# Patient Record
Sex: MALE | Race: BLACK OR AFRICAN AMERICAN | NOT HISPANIC OR LATINO | URBAN - METROPOLITAN AREA
[De-identification: names, ages, dates, MRNs, and addresses within clinical notes are randomized per-mention and may not be internally consistent; named-entity substitution may affect disease eponyms.]

---

## 2024-01-01 ENCOUNTER — EMERGENCY (EMERGENCY)
Facility: HOSPITAL | Age: 44
LOS: 1 days | Discharge: HOME | End: 2024-01-01
Admitting: EMERGENCY MEDICINE
Payer: COMMERCIAL

## 2024-01-01 VITALS
HEART RATE: 56 BPM | SYSTOLIC BLOOD PRESSURE: 143 MMHG | DIASTOLIC BLOOD PRESSURE: 93 MMHG | RESPIRATION RATE: 18 BRPM | OXYGEN SATURATION: 99 % | TEMPERATURE: 97 F

## 2024-01-01 PROCEDURE — 99284 EMERGENCY DEPT VISIT MOD MDM: CPT

## 2024-01-01 RX ORDER — DIAZEPAM 5 MG
5 TABLET ORAL ONCE
Refills: 0 | Status: DISCONTINUED | OUTPATIENT
Start: 2024-01-01 | End: 2024-01-01

## 2024-01-01 RX ORDER — LIDOCAINE 4 G/100G
1 CREAM TOPICAL ONCE
Refills: 0 | Status: COMPLETED | OUTPATIENT
Start: 2024-01-01 | End: 2024-01-01

## 2024-01-01 RX ORDER — KETOROLAC TROMETHAMINE 30 MG/ML
30 SYRINGE (ML) INJECTION ONCE
Refills: 0 | Status: DISCONTINUED | OUTPATIENT
Start: 2024-01-01 | End: 2024-01-01

## 2024-01-01 RX ORDER — OXYCODONE HYDROCHLORIDE 5 MG/1
5 TABLET ORAL ONCE
Refills: 0 | Status: DISCONTINUED | OUTPATIENT
Start: 2024-01-01 | End: 2024-01-01

## 2024-01-01 RX ADMIN — LIDOCAINE 1 PATCH: 4 CREAM TOPICAL at 23:47

## 2024-01-01 RX ADMIN — OXYCODONE HYDROCHLORIDE 5 MILLIGRAM(S): 5 TABLET ORAL at 23:47

## 2024-01-01 RX ADMIN — Medication 5 MILLIGRAM(S): at 23:47

## 2024-01-01 RX ADMIN — Medication 30 MILLIGRAM(S): at 23:47

## 2024-01-01 NOTE — ED ADULT TRIAGE NOTE - CHIEF COMPLAINT QUOTE
Patient c/o left-sided back pain x 1 week. Denies trauma, numbness/tingling and incontinence. Hx. back surgery.

## 2024-01-01 NOTE — ED PROVIDER NOTE - PATIENT PORTAL LINK FT
You can access the FollowMyHealth Patient Portal offered by [facility] by registering at the following website: [email or web address]. By joining [employer]’s FollowMyHealth portal, you will also be able to view your health information using other applications (apps) compatible with our system. You can access the FollowMyHealth Patient Portal offered by [facility] by registering at the following website: [email or web address]. By joining [employer]’s FollowMyHealth portal, you will also be able to view your health information using other applications (apps) compatible with our system.

## 2024-01-01 NOTE — ED ADULT NURSE NOTE - OBJECTIVE STATEMENT
Pt. presents to intake c/o left lower back pain denies any trauma. states he strained it. this has happened before. denies PMHx. medicated per order. pending reassessment

## 2024-01-01 NOTE — ED PROVIDER NOTE - PROGRESS NOTE DETAILS
NATHEN Mack Note----This patient was signed out to me by NATHEN Spencer; pt was pending CT lumbar spine at time of sign-out.  Pt had received polyanalgesia.  In the interim, pt objectively noted to be resting comfortably; pt has been clinically stable; no issues thus far.  Pt has been observed to have no limited ROM and has been ambulatory.  CT lumbar spine showed: "...Findings:  5 lumbar type vertebral bodies. Vertebral body and disc space heights are maintained. No acute fracture. No listhesis. No prevertebral edema. Small disc osteophyte complex at L5-S1 with superimposed left paracentral disc protrusion, results in narrowing of the left lateral recess and borderline mild left foraminal stenosis. Paraspinal soft tissues are unremarkable.  Impression: Minimal degenerative changes of the lumbosacral junction, detailed above.".  Findings discussed with pt who states he feels improvement with meds given in ED and feels comfortable for dc home.  Pt will be dc'd per below instructions.

## 2024-01-01 NOTE — ED PROVIDER NOTE - CLINICAL SUMMARY MEDICAL DECISION MAKING FREE TEXT BOX
43yoM hx of prior back surgery approx 10 years ago, p/w worsening L sided low back pain over past week, atraumatic. pt described as constant, worse w/ prolonged sitting, and standing. 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 Denies any numbness, tingling, bowel/bladder incontinence, saddle anesthesia, fevers, chest pain, shortness of breath. Has not had MRI imaging in years. admits to difficulty ambulating prompting ER eval today. denies fevers, ivda.    given degree of pain, and hx of spinal surgery in past, will order CT imaging, analgesics, muscle relaxants, and reassess post CTresults.  if pain improved and CT imaging unremarkable, will dc  if CT abnornal, and pain uncontrolled, would consider CDU for MRI and pain control

## 2024-01-01 NOTE — ED PROVIDER NOTE - PHYSICAL EXAMINATION
CONSTITUTIONAL: Well-appearing; well-nourished; in no apparent distress;  HEAD: Normocephalic, atraumatic;  EYES: conjunctiva and sclera WNL;  NECK/LYMPH: Supple  CARD: Normal S1, S2; no murmurs, rubs, or gallops noted  RESP: Normal chest excursion with respiration; breath sounds clear and equal bilaterally; no wheezes, rhonchi, or rales noted  ABD/GI: soft, non-distended; non-tender; no palpable organomegaly, no pulsatile mass  EXT/MS: no visible deformity. + midline L:5-S1 region ttp.+ L paralumbar and gluteal region ttp. negative straight leg raise b/l. 5/5 strength and plantar/dorsiflexion RLE. 4/5 strength LLE, 5/5 plantar and dorsiflexion b/l. distal pulses are normal, no pedal edema  SKIN: Normal for age and race; warm; dry; good turgor; no apparent lesions or exudate noted  NEURO: Awake, alert, oriented x 3, no gross deficits, CN II-XII grossly intact, no motor or sensory deficit noted  PSYCH: Normal mood; appropriate affect

## 2024-01-01 NOTE — ED ADULT NURSE NOTE - NSFALLUNIVINTERV_ED_ALL_ED
Bed/Stretcher in lowest position, wheels locked, appropriate side rails in place/Call bell, personal items and telephone in reach/Instruct patient to call for assistance before getting out of bed/chair/stretcher/Non-slip footwear applied when patient is off stretcher/[place] to call system/Physically safe environment - no spills, clutter or unnecessary equipment/Purposeful proactive rounding/Room/bathroom lighting operational, light cord in reach Bed/Stretcher in lowest position, wheels locked, appropriate side rails in place/Call bell, personal items and telephone in reach/Instruct patient to call for assistance before getting out of bed/chair/stretcher/Non-slip footwear applied when patient is off stretcher/[facility] to call system/Physically safe environment - no spills, clutter or unnecessary equipment/Purposeful proactive rounding/Room/bathroom lighting operational, light cord in reach

## 2024-01-01 NOTE — ED PROVIDER NOTE - NSFOLLOWUPINSTRUCTIONS_ED_ALL_ED_FT
Follow up with a general medical primary care doctor within 2-3 days of ER discharge.  **Bring your discharge papers / test results with you to your follow up appointment.    For assistance finding a doctor to follow up with, you may call the [facility] Patient Access Services helpline at [phone] to find names/contact #s for a provider/specialist to follow up with.    Rest / no strenuous activity.  Stay well hydrated.    A copy of your test results is being provided to you.  All results including incidental findings were reviewed at discharge.  Should you have any questions that arise after you are discharged, please feel free to contact the ER ([phone]) to have your questions answered.    MEDICATIONS:  See attached medication sheet for details of prescriptions sent to your pharmacy.  These medication prescription details were reviewed with you; please make sure to take all medications AS PRESCRIBED.    DON'T drive on Percocet (oxycodone/acetaminophen) medication as this can cause drowsiness and slowed reflexes which will put your life and safety at risk as well as the lives/safety of others on the road at risk.  DON'T use any other machinery while on this medication.    ***Return to the Emergency Department IMMEDIATELY if you experience any new / worsening symptoms or have any problems / concerns.*** Follow up with a general medical primary care doctor within 2-3 days of ER discharge.  **Bring your discharge papers / test results with you to your follow up appointment.    For assistance finding a doctor to follow up with, you may call the [facility] Patient Access Services helpline at [phone] to find names/contact #s for a provider/specialist to follow up with.    Rest / no strenuous activity.  Stay well hydrated.    A copy of your test results is being provided to you.  All results including incidental findings were reviewed at discharge.  Should you have any questions that arise after you are discharged, please feel free to contact the ER ([phone]) to have your questions answered.    MEDICATIONS:  See attached medication sheet for details of prescriptions sent to your pharmacy.  These medication prescription details were reviewed with you; please make sure to take all medications AS PRESCRIBED.    DON'T drive on Percocet (oxycodone/acetaminophen) medication as this can cause drowsiness and slowed reflexes which will put your life and safety at risk as well as the lives/safety of others on the road at risk.  DON'T use any other machinery while on this medication.    ***Return to the Emergency Department IMMEDIATELY if you experience any new / worsening symptoms or have any problems / concerns.***

## 2024-01-01 NOTE — ED PROVIDER NOTE - OBJECTIVE STATEMENT
43yoM hx of prior back surgery approx 10 years ago, p/w worsening L sided low back pain over past week, atraumatic. pt described as constant, worse w/ prolonged sitting, and standing. 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 Denies any numbness, tingling, bowel/bladder incontinence, saddle anesthesia, fevers, chest pain, shortness of breath. Has not had MRI imaging in years. had difficulty ambulating today prompting ER evaluation. denies fevers, ivda.

## 2024-01-02 VITALS
DIASTOLIC BLOOD PRESSURE: 73 MMHG | SYSTOLIC BLOOD PRESSURE: 113 MMHG | HEART RATE: 58 BPM | TEMPERATURE: 97 F | OXYGEN SATURATION: 99 % | RESPIRATION RATE: 18 BRPM

## 2024-01-02 PROCEDURE — 72131 CT LUMBAR SPINE W/O DYE: CPT | Mod: 26,MA

## 2024-01-02 RX ORDER — LIDOCAINE 4 G/100G
1 CREAM TOPICAL
Qty: 3 | Refills: 0
Start: 2024-01-02

## 2024-01-02 RX ORDER — OXYCODONE AND ACETAMINOPHEN 5; 325 MG/1; MG/1
1 TABLET ORAL
Qty: 10 | Refills: 0
Start: 2024-01-02

## 2024-01-02 RX ORDER — IBUPROFEN 200 MG
1 TABLET ORAL
Qty: 30 | Refills: 0
Start: 2024-01-02

## 2024-01-02 RX ORDER — CYCLOBENZAPRINE HYDROCHLORIDE 10 MG/1
1 TABLET, FILM COATED ORAL
Qty: 30 | Refills: 0
Start: 2024-01-02

## 2024-01-16 ENCOUNTER — EMERGENCY (EMERGENCY)
Facility: HOSPITAL | Age: 44
LOS: 1 days | Discharge: HOME | End: 2024-01-16
Attending: EMERGENCY MEDICINE | Admitting: EMERGENCY MEDICINE
Payer: COMMERCIAL

## 2024-01-16 VITALS
DIASTOLIC BLOOD PRESSURE: 87 MMHG | OXYGEN SATURATION: 98 % | HEART RATE: 74 BPM | SYSTOLIC BLOOD PRESSURE: 136 MMHG | RESPIRATION RATE: 17 BRPM

## 2024-01-16 VITALS
DIASTOLIC BLOOD PRESSURE: 89 MMHG | SYSTOLIC BLOOD PRESSURE: 124 MMHG | RESPIRATION RATE: 15 BRPM | HEART RATE: 72 BPM | TEMPERATURE: 98 F | OXYGEN SATURATION: 100 %

## 2024-01-16 PROBLEM — Z00.00 ENCOUNTER FOR PREVENTIVE HEALTH EXAMINATION: Status: ACTIVE | Noted: 2024-01-16

## 2024-01-16 PROCEDURE — 99284 EMERGENCY DEPT VISIT MOD MDM: CPT

## 2024-01-16 RX ORDER — LIDOCAINE 4 G/100G
1 CREAM TOPICAL ONCE
Refills: 0 | Status: COMPLETED | OUTPATIENT
Start: 2024-01-16 | End: 2024-01-16

## 2024-01-16 RX ORDER — ACETAMINOPHEN 500 MG
650 TABLET ORAL ONCE
Refills: 0 | Status: COMPLETED | OUTPATIENT
Start: 2024-01-16 | End: 2024-01-16

## 2024-01-16 RX ORDER — IBUPROFEN 200 MG
600 TABLET ORAL ONCE
Refills: 0 | Status: COMPLETED | OUTPATIENT
Start: 2024-01-16 | End: 2024-01-16

## 2024-01-16 RX ADMIN — LIDOCAINE 1 PATCH: 4 CREAM TOPICAL at 09:50

## 2024-01-16 RX ADMIN — Medication 600 MILLIGRAM(S): at 09:52

## 2024-01-16 RX ADMIN — Medication 650 MILLIGRAM(S): at 09:51

## 2024-01-16 NOTE — ED PROVIDER NOTE - OBJECTIVE STATEMENT
43M c/o low back pain, L sided. Seen here 1/1 and had CT showing no fx, but Lumbar left foraminal stenosis.  H/o back surgery also.  Pt has not yet returned to work, pt works as a  at [employer].  Pt here because he wants to go back to work and needs a doctor note to do so.  Pt was rx'ed cyclobenaprine, lidocaine patch, motrin, oxycodone.  Pt reports did not take any pain medicine today.  Pt reports still having low back pain, bought himself a lumbar brace.  Pt has not made appt with PMD or spine center.  no weakness/numbness of feet, no B/B incontinence.  PMHX denies. PSHX back sx.  D/w Dr Gonzales - we can not clear patients to go back to work especially given he is still having back pain, has a physical job, and we are not equipped to determine occupational medicine type clearance.  Rx pain meds.  Will contact d/c anne-marie, rx pain meds, f/u spine center and PMD.    VS:  unremarkable    GEN - NAD;   well appearing;   A+O x3   HEAD - NC/AT     ENT - PEERL, EOMI, mucous membranes    moist , no discharge      NECK: Neck supple, non-tender without lymphadenopathy, no masses, no JVD  PULM - CTA b/l,  symmetric breath sounds  COR -  normal heart sounds    ABD - , ND, NT, soft,  BACK - no CVA tenderness, nontender spine   (+)PVM spasm L lumbar, mild ttp at L SI joint.  No midline ttp or skin change or deformity.   EXTREMS - no edema, no deformity, warm and well perfused    SKIN - no rash    or bruising      NEUROLOGIC - alert, face symmetric, speech fluent, sensation nl, motor no focal deficit. 43M c/o low back pain, L sided. Seen here 1/1 and had CT showing no fx, but Lumbar left foraminal stenosis.  H/o back surgery also.  Pt has not yet returned to work, pt works as a  at [employer].  Pt here because he wants to go back to work and needs a doctor note to do so.  Pt was rx'ed cyclobenaprine, lidocaine patch, motrin, oxycodone.  Pt reports did not take any pain medicine today.  Pt reports still having low back pain, bought himself a lumbar brace.  Pt has not made appt with PMD or spine center.  no weakness/numbness of feet, no B/B incontinence.  PMHX denies. PSHX back sx.  D/w Dr Gonzales - we can not clear patients to go back to work especially given he is still having back pain, has a physical job, and we are not equipped to determine occupational medicine type clearance.  Rx pain meds.  Will contact d/c anne-marie, rx pain meds, f/u spine center and PMD.    VS:  unremarkable    GEN - NAD;   well appearing;   A+O x3   HEAD - NC/AT     ENT - PEERL, EOMI, mucous membranes    moist , no discharge      NECK: Neck supple, non-tender without lymphadenopathy, no masses, no JVD  PULM - CTA b/l,  symmetric breath sounds  COR -  normal heart sounds    ABD - , ND, NT, soft,  BACK - no CVA tenderness, nontender spine   (+)PVM spasm L lumbar, mild ttp at L SI joint.  No midline ttp or skin change or deformity.   EXTREMS - no edema, no deformity, warm and well perfused    SKIN - no rash    or bruising      NEUROLOGIC - alert, face symmetric, speech fluent, sensation nl, motor no focal deficit.

## 2024-01-16 NOTE — ED ADULT NURSE NOTE - OBJECTIVE STATEMENT
Pt received to wellness. Pt is A&Ox3, ambulatory. Pt presents to ED for medical clearance to return to work. Pt was seen by MD 2 weeks ago for back pain. endorsing mild discomfort at this time. breathing is even and nonlabored. MD at bedside for evaluation. Stretcher set in lowest position, call bell within reach, safety maintained.

## 2024-01-16 NOTE — ED PROVIDER NOTE - PHYSICAL EXAMINATION
VS:  unremarkable    GEN - NAD;   well appearing;   A+O x3   HEAD - NC/AT     ENT - PEERL, EOMI, mucous membranes    moist , no discharge      NECK: Neck supple, non-tender without lymphadenopathy, no masses, no JVD  PULM - CTA b/l,  symmetric breath sounds  COR -  normal heart sounds    ABD - , ND, NT, soft,  BACK - no CVA tenderness, nontender spine   (+)PVM spasm L lumbar, mild ttp at L SI joint.  No midline ttp or skin change or deformity.   EXTREMS - no edema, no deformity, warm and well perfused    SKIN - no rash    or bruising      NEUROLOGIC - alert, face symmetric, speech fluent, sensation nl, motor no focal deficit.

## 2024-01-16 NOTE — ED ADULT NURSE NOTE - NSFALLUNIVINTERV_ED_ALL_ED
Bed/Stretcher in lowest position, wheels locked, appropriate side rails in place/Call bell, personal items and telephone in reach/Instruct patient to call for assistance before getting out of bed/chair/stretcher/Non-slip footwear applied when patient is off stretcher/[place] to call system/Physically safe environment - no spills, clutter or unnecessary equipment/Purposeful proactive rounding/Room/bathroom lighting operational, light cord in reach Bed/Stretcher in lowest position, wheels locked, appropriate side rails in place/Call bell, personal items and telephone in reach/Instruct patient to call for assistance before getting out of bed/chair/stretcher/Non-slip footwear applied when patient is off stretcher/[place] to call system/Physically safe environment - no spills, clutter or unnecessary equipment/Purposeful proactive rounding/Room/bathroom lighting operational, light cord in reach

## 2024-01-16 NOTE — ED PROVIDER NOTE - CLINICAL SUMMARY MEDICAL DECISION MAKING FREE TEXT BOX
43M c/o low back pain, L sided. Seen here 1/1 and had CT showing no fx, but Lumbar left foraminal stenosis.  H/o back surgery also.  Pt has not yet returned to work, pt works as a  at [employer].  Pt here because he wants to go back to work and needs a doctor note to do so.  Pt was rx'ed cyclobenaprine, lidocaine patch, motrin, oxycodone.  Pt reports did not take any pain medicine today.  Pt reports still having low back pain, bought himself a lumbar brace.  Pt has not made appt with PMD or spine center.  no weakness/numbness of feet, no B/B incontinence.  PMHX denies. PSHX back sx.  D/w Dr oGnzales - we can not clear patients to go back to work especially given he is still having back pain, has a physical job, and we are not equipped to determine occupational medicine type clearance.  Rx pain meds.  Will contact d/c anne-marie, rx pain meds, f/u spine center and PMD. 43M c/o low back pain, L sided. Seen here 1/1 and had CT showing no fx, but Lumbar left foraminal stenosis.  H/o back surgery also.  Pt has not yet returned to work, pt works as a  at [employer].  Pt here because he wants to go back to work and needs a doctor note to do so.  Pt was rx'ed cyclobenaprine, lidocaine patch, motrin, oxycodone.  Pt reports did not take any pain medicine today.  Pt reports still having low back pain, bought himself a lumbar brace.  Pt has not made appt with PMD or spine center.  no weakness/numbness of feet, no B/B incontinence.  PMHX denies. PSHX back sx.  D/w Dr Gonzales - we can not clear patients to go back to work especially given he is still having back pain, has a physical job, and we are not equipped to determine occupational medicine type clearance.  Rx pain meds.  Will contact d/c anne-marie, rx pain meds, f/u spine center and PMD.

## 2024-01-16 NOTE — ED PROVIDER NOTE - PATIENT PORTAL LINK FT
You can access the FollowMyHealth Patient Portal offered by [facility] by registering at the following website: [email or web address]. By joining [employer]’s FollowMyHealth portal, you will also be able to view your health information using other applications (apps) compatible with our system. You can access the FollowMyHealth Patient Portal offered by [facility] by registering at the following website: [email or web address]. By joining [employer]’s FollowMyHealth portal, you will also be able to view your health information using other applications (apps) compatible with our system.

## 2024-01-16 NOTE — ED PROVIDER NOTE - NSFOLLOWUPINSTRUCTIONS_ED_ALL_ED_FT
WE ARE NOT ABLE TO CLEAR YOU TO GO BACK TO WORK, THAT IS NOT OUR FUNCTION IN THE EMERGENCY DEPARTMENT.  YOU DO NOT NEED TO BE ADMITTED TO THE HOSPITAL FOR YOUR SYMPTOMS.      YOU MUST GO TO A PRIMARY CARE DOCTOR OR SPINE SPECIALIST TO EVALUATE YOU TO RETURN TO WORK.    FOLLOW UP WITH YOUR  DOCTOR WITHIN 1 WEEK  BRING THE COPIES OF YOUR RESULTS WITH YOU (PROVIDED)  CAN TAKE TYLENOL 650MG ORALLY EVERY 6 HOURS FOR PAIN OR FEVER.  IBUPROFEN 600MG ORALLY EVERY 6 HOURS FOR PAIN OR FEVER.    YOU CAN USE LIDOCAINE PATCH AVAILABLE OVER THE COUNTER FOR PAIN  CAN TAKE TYLENOL AND IBUPROFEN AT THE SAME TIME  RETURN TO ED FOR NEW OR WORSENING SYMPTOMS.

## 2024-01-16 NOTE — ED ADULT TRIAGE NOTE - CHIEF COMPLAINT QUOTE
pt states he was seen 2 weeks ago for back pain. 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

## 2024-01-17 ENCOUNTER — APPOINTMENT (OUTPATIENT)
Age: 44
End: 2024-01-17
Payer: COMMERCIAL

## 2024-01-17 VITALS
HEIGHT: 68 IN | DIASTOLIC BLOOD PRESSURE: 82 MMHG | SYSTOLIC BLOOD PRESSURE: 121 MMHG | BODY MASS INDEX: 32.58 KG/M2 | WEIGHT: 215 LBS | HEART RATE: 67 BPM

## 2024-01-17 DIAGNOSIS — M51.36 OTHER INTERVERTEBRAL DISC DEGENERATION, LUMBAR REGION: ICD-10-CM

## 2024-01-17 DIAGNOSIS — Z82.49 FAMILY HISTORY OF ISCHEMIC HEART DISEASE AND OTHER DISEASES OF THE CIRCULATORY SYSTEM: ICD-10-CM

## 2024-01-17 PROCEDURE — 99203 OFFICE O/P NEW LOW 30 MIN: CPT

## 2024-01-17 NOTE — HISTORY OF PRESENT ILLNESS
[de-identified] : Patient is a 43-year-old male with chief complaint of back pain.  No recent treatment.  He has had an MRI.  No radicular complaints.  Last sentence

## 2024-01-17 NOTE — PHYSICAL EXAM
[Antalgic] : not antalgic [de-identified] : Examination of the lumbar spine reveals no midline tenderness palpation, step-offs, or skin lesions. Decreased range of motion with respect to flexion, extension, lateral bending, and rotation. No tenderness to palpation of the sciatic notch. No tenderness palpation of the bilateral greater trochanters. No pain with passive internal/external rotation of the hips. No instability of bilateral lower extremities.  Negative ROSEMARY. Negative straight leg raise bilaterally. No bowstring. Negative femoral stretch. 5 out of 5 iliopsoas, hip abductors, hips adductors, quadriceps, hamstrings, gastrocsoleus, tibialis anterior, extensor hallucis longus, peroneals. Grossly intact sensation to light touch bilateral lower extremities. 1+ patellar and Achilles reflexes. Downgoing Babinski. No clonus. Intact proprioception. Palpable pulses. No skin lesion and no edema on the right and left lower extremities. [de-identified] : Lumbar imaging reveals degenerative disc disease without high-grade neural compression

## 2024-01-17 NOTE — DISCUSSION/SUMMARY
[de-identified] : We discussed further treatment options.  He will try course of physical therapy.  Follow-up afterwards or sooner with any changes or worsening of his symptoms.

## 2024-01-22 ENCOUNTER — NON-APPOINTMENT (OUTPATIENT)
Age: 44
End: 2024-01-22